# Patient Record
Sex: FEMALE | Race: WHITE | NOT HISPANIC OR LATINO | URBAN - METROPOLITAN AREA
[De-identification: names, ages, dates, MRNs, and addresses within clinical notes are randomized per-mention and may not be internally consistent; named-entity substitution may affect disease eponyms.]

---

## 2023-12-13 ENCOUNTER — NURSING HOME VISIT (OUTPATIENT)
Dept: POST ACUTE CARE | Facility: EXTERNAL LOCATION | Age: 88
End: 2023-12-13
Payer: MEDICARE

## 2023-12-13 DIAGNOSIS — F03.B0 MODERATE DEMENTIA WITHOUT BEHAVIORAL DISTURBANCE, PSYCHOTIC DISTURBANCE, MOOD DISTURBANCE, OR ANXIETY, UNSPECIFIED DEMENTIA TYPE (MULTI): Primary | ICD-10-CM

## 2023-12-13 DIAGNOSIS — I10 HYPERTENSION, ESSENTIAL: ICD-10-CM

## 2023-12-13 DIAGNOSIS — K21.9 GASTROESOPHAGEAL REFLUX DISEASE WITHOUT ESOPHAGITIS: ICD-10-CM

## 2023-12-13 PROCEDURE — 99349 HOME/RES VST EST MOD MDM 40: CPT

## 2023-12-13 NOTE — LETTER
Patient: Hodan Henry  : 1929    Encounter Date: 2023    PROGRESS NOTE    Subjective  Chief complaint: Hodan Henry is a 89 y.o. female who is an assisted living facility patient being seen and evaluated for  general medical care and monthly follow-up    HPI:  Patient seen and evaluated for general medical care and monthly follow-up.  Admitted to AL for assistance with ADLs and medical care.   PMH includes OP, HTN, dementia,, GERD. Patient at baseline mentation, oriented x 2, converses easily.  Offers no complaints at time.  Patient ambulates without assistive devices- gait steady.  BP within goal. Patient engaging with staff.  No concerns per nursing      Objective  Vital signs:  125/69-97.3-72-17    Physical Exam  Constitutional:       Appearance: Normal appearance.   HENT:      Head: Normocephalic.      Mouth/Throat:      Mouth: Mucous membranes are moist.   Eyes:      Extraocular Movements: Extraocular movements intact.   Cardiovascular:      Rate and Rhythm: Normal rate and regular rhythm.      Pulses: Normal pulses.      Heart sounds: Normal heart sounds.   Pulmonary:      Effort: Pulmonary effort is normal.      Breath sounds: Normal breath sounds.   Abdominal:      General: Bowel sounds are normal.      Palpations: Abdomen is soft.   Musculoskeletal:         General: Normal range of motion.      Cervical back: Normal range of motion and neck supple.   Skin:     General: Skin is warm and dry.      Capillary Refill: Capillary refill takes less than 2 seconds.   Neurological:      Mental Status: She is alert. Mental status is at baseline.   Psychiatric:         Mood and Affect: Mood normal.         Behavior: Behavior normal.         Assessment/Plan  Problem List Items Addressed This Visit       Hypertension, essential      Stable   BP at goal   lisinopril metoprolol   monitor         Dementia (CMS/[facility]) - Primary      Stable   baseline mentation- oriented x 2   converses easily   donepezil memantine    monitor         Gastroesophageal reflux disease without esophagitis      Stable   no epigastric pain or acidic taste in mouth   Encouraged to sit up after eating   Omeprazole   monitor          Medications, treatments, and labs reviewed  Continue medications and treatments as listed in EMR    SANIYA Burt      Electronically Signed By: SANIYA Burt   12/29/23  5:59 PM

## 2023-12-29 PROBLEM — M81.0 OP (OSTEOPOROSIS): Status: ACTIVE | Noted: 2023-12-29

## 2023-12-29 PROBLEM — I10 HYPERTENSION, ESSENTIAL: Status: ACTIVE | Noted: 2023-12-29

## 2023-12-29 PROBLEM — F03.90 DEMENTIA (MULTI): Status: ACTIVE | Noted: 2023-12-29

## 2023-12-29 PROBLEM — K21.9 GASTROESOPHAGEAL REFLUX DISEASE WITHOUT ESOPHAGITIS: Status: ACTIVE | Noted: 2023-12-29

## 2023-12-29 NOTE — ASSESSMENT & PLAN NOTE
Stable   no epigastric pain or acidic taste in mouth   Encouraged to sit up after eating   Omeprazole   monitor

## 2023-12-29 NOTE — PROGRESS NOTES
PROGRESS NOTE    Subjective   Chief complaint: Hodan Henry is a 89 y.o. female who is an assisted living facility patient being seen and evaluated for  general medical care and monthly follow-up    HPI:  Patient seen and evaluated for general medical care and monthly follow-up.  Admitted to AL for assistance with ADLs and medical care.   PMH includes OP, HTN, dementia,, GERD. Patient at baseline mentation, oriented x 2, converses easily.  Offers no complaints at time.  Patient ambulates without assistive devices- gait steady.  BP within goal. Patient engaging with staff.  No concerns per nursing      Objective   Vital signs:  125/69-97.3-72-17    Physical Exam  Constitutional:       Appearance: Normal appearance.   HENT:      Head: Normocephalic.      Mouth/Throat:      Mouth: Mucous membranes are moist.   Eyes:      Extraocular Movements: Extraocular movements intact.   Cardiovascular:      Rate and Rhythm: Normal rate and regular rhythm.      Pulses: Normal pulses.      Heart sounds: Normal heart sounds.   Pulmonary:      Effort: Pulmonary effort is normal.      Breath sounds: Normal breath sounds.   Abdominal:      General: Bowel sounds are normal.      Palpations: Abdomen is soft.   Musculoskeletal:         General: Normal range of motion.      Cervical back: Normal range of motion and neck supple.   Skin:     General: Skin is warm and dry.      Capillary Refill: Capillary refill takes less than 2 seconds.   Neurological:      Mental Status: She is alert. Mental status is at baseline.   Psychiatric:         Mood and Affect: Mood normal.         Behavior: Behavior normal.         Assessment/Plan   Problem List Items Addressed This Visit       Hypertension, essential      Stable   BP at goal   lisinopril metoprolol   monitor         Dementia (CMS/HCC) - Primary      Stable   baseline mentation- oriented x 2   converses easily   donepezil memantine   monitor         Gastroesophageal reflux disease without esophagitis       Stable   no epigastric pain or acidic taste in mouth   Encouraged to sit up after eating   Omeprazole   monitor          Medications, treatments, and labs reviewed  Continue medications and treatments as listed in EMR    Darling Harry, APRN-CNP

## 2024-01-05 ENCOUNTER — NURSING HOME VISIT (OUTPATIENT)
Dept: POST ACUTE CARE | Facility: EXTERNAL LOCATION | Age: 89
End: 2024-01-05
Payer: MEDICARE

## 2024-01-05 DIAGNOSIS — K21.9 GASTROESOPHAGEAL REFLUX DISEASE WITHOUT ESOPHAGITIS: ICD-10-CM

## 2024-01-05 DIAGNOSIS — I10 HYPERTENSION, ESSENTIAL: ICD-10-CM

## 2024-01-05 DIAGNOSIS — F03.B0 MODERATE DEMENTIA WITHOUT BEHAVIORAL DISTURBANCE, PSYCHOTIC DISTURBANCE, MOOD DISTURBANCE, OR ANXIETY, UNSPECIFIED DEMENTIA TYPE (MULTI): Primary | ICD-10-CM

## 2024-01-05 PROCEDURE — 99349 HOME/RES VST EST MOD MDM 40: CPT

## 2024-01-05 NOTE — LETTER
Patient: Hodan Henry  : 1929    Encounter Date: 2024    PROGRESS NOTE    Subjective  Chief complaint: Hodan Henry is a 89 y.o. female who is an assisted living facility patient being seen and evaluated for  general medical care and monthly follow-up    HPI:  Patient seen and evaluated for general medical care and monthly follow-up.  Patient at baseline mentation, confused, calm, cooperative.  No outbursts or agitation noted per nursing. Patient requires assistance with ADLs. Offers no complaints at time.  BP at goal. No concerns per nursing      Objective  Vital signs:  104/64-97.1-87-18-96%    Physical Exam  Constitutional:       Appearance: Normal appearance.   HENT:      Head: Normocephalic.      Mouth/Throat:      Mouth: Mucous membranes are moist.   Eyes:      Extraocular Movements: Extraocular movements intact.   Cardiovascular:      Rate and Rhythm: Normal rate and regular rhythm.      Pulses: Normal pulses.      Heart sounds: Normal heart sounds.   Pulmonary:      Effort: Pulmonary effort is normal.      Breath sounds: Normal breath sounds.   Abdominal:      General: Bowel sounds are normal.      Palpations: Abdomen is soft.   Musculoskeletal:         General: Normal range of motion.      Cervical back: Normal range of motion and neck supple.      Comments:   Generalized weakness   Skin:     General: Skin is warm and dry.      Capillary Refill: Capillary refill takes less than 2 seconds.   Neurological:      Mental Status: She is alert. Mental status is at baseline. She is disoriented.   Psychiatric:         Behavior: Behavior normal.         Assessment/Plan  Problem List Items Addressed This Visit       Hypertension, essential      Stable   BP at goal   lisinopril metoprolol   monitor         Dementia (CMS/[facility]) - Primary      Stable   baseline mentation   converses easily   donepezil memantine   monitor         Gastroesophageal reflux disease without esophagitis      Stable   no epigastric pain or  acidic taste in mouth   Encouraged to sit up after eating   Omeprazole   monitor          Medications, treatments, and labs reviewed  Continue medications and treatments as listed in EMR    SANIYA Burt      Electronically Signed By: SANIYA Burt   1/13/24 12:39 PM

## 2024-01-13 PROBLEM — R63.0 POOR APPETITE: Status: ACTIVE | Noted: 2024-01-13

## 2024-01-13 NOTE — PROGRESS NOTES
PROGRESS NOTE    Subjective   Chief complaint: Hodan Henry is a 89 y.o. female who is an assisted living facility patient being seen and evaluated for  general medical care and monthly follow-up    HPI:  Patient seen and evaluated for general medical care and monthly follow-up.  Patient at baseline mentation, confused, calm, cooperative.  No outbursts or agitation noted per nursing. Patient requires assistance with ADLs. Offers no complaints at time.  BP at goal. No concerns per nursing      Objective   Vital signs:  104/64-97.1-87-18-96%    Physical Exam  Constitutional:       Appearance: Normal appearance.   HENT:      Head: Normocephalic.      Mouth/Throat:      Mouth: Mucous membranes are moist.   Eyes:      Extraocular Movements: Extraocular movements intact.   Cardiovascular:      Rate and Rhythm: Normal rate and regular rhythm.      Pulses: Normal pulses.      Heart sounds: Normal heart sounds.   Pulmonary:      Effort: Pulmonary effort is normal.      Breath sounds: Normal breath sounds.   Abdominal:      General: Bowel sounds are normal.      Palpations: Abdomen is soft.   Musculoskeletal:         General: Normal range of motion.      Cervical back: Normal range of motion and neck supple.      Comments:   Generalized weakness   Skin:     General: Skin is warm and dry.      Capillary Refill: Capillary refill takes less than 2 seconds.   Neurological:      Mental Status: She is alert. Mental status is at baseline. She is disoriented.   Psychiatric:         Behavior: Behavior normal.         Assessment/Plan   Problem List Items Addressed This Visit       Hypertension, essential      Stable   BP at goal   lisinopril metoprolol   monitor         Dementia (CMS/[facility]) - Primary      Stable   baseline mentation   converses easily   donepezil memantine   monitor         Gastroesophageal reflux disease without esophagitis      Stable   no epigastric pain or acidic taste in mouth   Encouraged to sit up after eating    Omeprazole   monitor          Medications, treatments, and labs reviewed  Continue medications and treatments as listed in EMR    Darling Harry, APRN-CNP

## 2024-02-13 ENCOUNTER — NURSING HOME VISIT (OUTPATIENT)
Dept: POST ACUTE CARE | Facility: EXTERNAL LOCATION | Age: 89
End: 2024-02-13
Payer: MEDICARE

## 2024-02-13 DIAGNOSIS — F03.B0 MODERATE DEMENTIA WITHOUT BEHAVIORAL DISTURBANCE, PSYCHOTIC DISTURBANCE, MOOD DISTURBANCE, OR ANXIETY, UNSPECIFIED DEMENTIA TYPE (MULTI): Primary | ICD-10-CM

## 2024-02-13 DIAGNOSIS — R53.1 GENERALIZED WEAKNESS: ICD-10-CM

## 2024-02-13 DIAGNOSIS — I10 HYPERTENSION, ESSENTIAL: ICD-10-CM

## 2024-02-13 DIAGNOSIS — K21.9 GASTROESOPHAGEAL REFLUX DISEASE WITHOUT ESOPHAGITIS: ICD-10-CM

## 2024-02-13 DIAGNOSIS — R63.0 POOR APPETITE: ICD-10-CM

## 2024-02-13 PROCEDURE — 99349 HOME/RES VST EST MOD MDM 40: CPT

## 2024-02-13 NOTE — LETTER
Patient: Hodan Henry  : 1929    Encounter Date: 2024    PROGRESS NOTE    Subjective  Chief complaint: Hodan Henry is a 89 y.o. female who is an assisted living facility patient being seen and evaluated for  general medical care and monthly follow-up    HPI:  Patient seen and evaluated for general medical care and monthly follow-up.  Patient at baseline mentation, cooperative, pleasant.  Offers no complaints.  No outbursts or agitation noted per nursing.  Continues to engage in activities and meals with other residents.  BP within goal.  No changes in appetite or sleeping pattern.  No concerns per nursing.      Objective  Vital signs:  127/80-97.1-71-19-99%    Physical Exam  Constitutional:       Appearance: Normal appearance.   HENT:      Head: Normocephalic.      Mouth/Throat:      Mouth: Mucous membranes are moist.   Eyes:      Extraocular Movements: Extraocular movements intact.   Cardiovascular:      Rate and Rhythm: Normal rate and regular rhythm.      Pulses: Normal pulses.      Heart sounds: Normal heart sounds.   Pulmonary:      Effort: Pulmonary effort is normal.      Breath sounds: Normal breath sounds.   Abdominal:      General: Bowel sounds are normal.      Palpations: Abdomen is soft.   Musculoskeletal:         General: Normal range of motion.      Cervical back: Normal range of motion and neck supple.      Comments:   Generalized weakness   Skin:     General: Skin is warm and dry.      Capillary Refill: Capillary refill takes less than 2 seconds.   Neurological:      Mental Status: She is alert. Mental status is at baseline.   Psychiatric:         Mood and Affect: Mood normal.         Behavior: Behavior normal.         Assessment/Plan  Problem List Items Addressed This Visit       Hypertension, essential      Stable   BP at goal   lisinopril metoprolol   monitor         Dementia (CMS/[facility]) - Primary      Stable   baseline mentation   converses easily   donepezil memantine   monitor          Gastroesophageal reflux disease without esophagitis      Stable   no epigastric pain or acidic taste in mouth   Encouraged to sit up after eating   Omeprazole   monitor         Poor appetite      Stable   Mirtazapine   boost supplements 3 times daily   monitor intake and weight         Generalized weakness      Reinforced calling for assistance when needed   walker assist          Medications, treatments, and labs reviewed  Continue medications and treatments as listed in EMR    SANIYA Burt      Electronically Signed By: SANIYA Burt   2/19/24  5:41 PM

## 2024-02-19 PROBLEM — R53.1 GENERALIZED WEAKNESS: Status: ACTIVE | Noted: 2024-02-19

## 2024-02-19 NOTE — PROGRESS NOTES
PROGRESS NOTE    Subjective   Chief complaint: Hodan Henry is a 89 y.o. female who is an assisted living facility patient being seen and evaluated for  general medical care and monthly follow-up    HPI:  Patient seen and evaluated for general medical care and monthly follow-up.  Patient at baseline mentation, cooperative, pleasant.  Offers no complaints.  No outbursts or agitation noted per nursing.  Continues to engage in activities and meals with other residents.  BP within goal.  No changes in appetite or sleeping pattern.  No concerns per nursing.      Objective   Vital signs:  127/80-97.1-71-19-99%    Physical Exam  Constitutional:       Appearance: Normal appearance.   HENT:      Head: Normocephalic.      Mouth/Throat:      Mouth: Mucous membranes are moist.   Eyes:      Extraocular Movements: Extraocular movements intact.   Cardiovascular:      Rate and Rhythm: Normal rate and regular rhythm.      Pulses: Normal pulses.      Heart sounds: Normal heart sounds.   Pulmonary:      Effort: Pulmonary effort is normal.      Breath sounds: Normal breath sounds.   Abdominal:      General: Bowel sounds are normal.      Palpations: Abdomen is soft.   Musculoskeletal:         General: Normal range of motion.      Cervical back: Normal range of motion and neck supple.      Comments:   Generalized weakness   Skin:     General: Skin is warm and dry.      Capillary Refill: Capillary refill takes less than 2 seconds.   Neurological:      Mental Status: She is alert. Mental status is at baseline.   Psychiatric:         Mood and Affect: Mood normal.         Behavior: Behavior normal.         Assessment/Plan   Problem List Items Addressed This Visit       Hypertension, essential      Stable   BP at goal   lisinopril metoprolol   monitor         Dementia (CMS/[facility]) - Primary      Stable   baseline mentation   converses easily   donepezil memantine   monitor         Gastroesophageal reflux disease without esophagitis      Stable   no  epigastric pain or acidic taste in mouth   Encouraged to sit up after eating   Omeprazole   monitor         Poor appetite      Stable   Mirtazapine   boost supplements 3 times daily   monitor intake and weight         Generalized weakness      Reinforced calling for assistance when needed   walker assist          Medications, treatments, and labs reviewed  Continue medications and treatments as listed in EMR    Darling Harry, APRN-CNP

## 2024-03-19 ENCOUNTER — NURSING HOME VISIT (OUTPATIENT)
Dept: POST ACUTE CARE | Facility: EXTERNAL LOCATION | Age: 89
End: 2024-03-19
Payer: MEDICARE

## 2024-03-19 DIAGNOSIS — K21.9 GASTROESOPHAGEAL REFLUX DISEASE WITHOUT ESOPHAGITIS: ICD-10-CM

## 2024-03-19 DIAGNOSIS — I10 HYPERTENSION, ESSENTIAL: ICD-10-CM

## 2024-03-19 DIAGNOSIS — F03.B0 MODERATE DEMENTIA WITHOUT BEHAVIORAL DISTURBANCE, PSYCHOTIC DISTURBANCE, MOOD DISTURBANCE, OR ANXIETY, UNSPECIFIED DEMENTIA TYPE (MULTI): Primary | ICD-10-CM

## 2024-03-19 PROCEDURE — 99349 HOME/RES VST EST MOD MDM 40: CPT

## 2024-03-19 NOTE — LETTER
Patient: Hodan Henry  : 1929    Encounter Date: 2024    PROGRESS NOTE    Subjective  Chief complaint: Hodan Henry is a 89 y.o. female who is an assisted living facility patient being seen and evaluated for general medical care and monthly follow-up    HPI:  Patient seen and evaluated for general medical care monthly follow-up.  Patient at baseline mentation, pleasant, cooperative.  Offers no complaints at time.  BP within goal.  Engages in activities and meals with other residents.  No outbursts or agitation noted per nursing.  No concerns per nursing at this time      Objective  Vital signs:  128/69-97.6-65-17-98%    Physical Exam  Constitutional:       Appearance: Normal appearance.   HENT:      Head: Normocephalic.      Mouth/Throat:      Mouth: Mucous membranes are moist.   Eyes:      Extraocular Movements: Extraocular movements intact.   Cardiovascular:      Rate and Rhythm: Normal rate and regular rhythm.      Pulses: Normal pulses.      Heart sounds: Normal heart sounds.   Pulmonary:      Effort: Pulmonary effort is normal.      Breath sounds: Normal breath sounds.   Abdominal:      General: Bowel sounds are normal.      Palpations: Abdomen is soft.   Musculoskeletal:         General: Normal range of motion.      Cervical back: Normal range of motion and neck supple.      Comments:  generalized weakness   Skin:     General: Skin is warm and dry.      Capillary Refill: Capillary refill takes less than 2 seconds.   Neurological:      Mental Status: She is alert. Mental status is at baseline.   Psychiatric:         Mood and Affect: Mood normal.         Behavior: Behavior normal.         Assessment/Plan  Problem List Items Addressed This Visit       Hypertension, essential      Stable   BP at goal   lisinopril metoprolol   monitor         Dementia (CMS/[facility]) - Primary      Stable   baseline mentation   converses easily   donepezil memantine   monitor         Gastroesophageal reflux disease without  esophagitis      Stable   no epigastric pain or acidic taste in mouth   Encouraged to sit up after eating   Omeprazole   monitor          Medications, treatments, and labs reviewed  Continue medications and treatments as listed in EMR    SANIYA Burt      Electronically Signed By: SANIYA Burt   3/19/24  7:56 PM

## 2024-03-19 NOTE — PROGRESS NOTES
PROGRESS NOTE    Subjective   Chief complaint: Hodan Henry is a 89 y.o. female who is an assisted living facility patient being seen and evaluated for general medical care and monthly follow-up    HPI:  Patient seen and evaluated for general medical care monthly follow-up.  Patient at baseline mentation, pleasant, cooperative.  Offers no complaints at time.  BP within goal.  Engages in activities and meals with other residents.  No outbursts or agitation noted per nursing.  No concerns per nursing at this time      Objective   Vital signs:  128/69-97.6-65-17-98%    Physical Exam  Constitutional:       Appearance: Normal appearance.   HENT:      Head: Normocephalic.      Mouth/Throat:      Mouth: Mucous membranes are moist.   Eyes:      Extraocular Movements: Extraocular movements intact.   Cardiovascular:      Rate and Rhythm: Normal rate and regular rhythm.      Pulses: Normal pulses.      Heart sounds: Normal heart sounds.   Pulmonary:      Effort: Pulmonary effort is normal.      Breath sounds: Normal breath sounds.   Abdominal:      General: Bowel sounds are normal.      Palpations: Abdomen is soft.   Musculoskeletal:         General: Normal range of motion.      Cervical back: Normal range of motion and neck supple.      Comments:  generalized weakness   Skin:     General: Skin is warm and dry.      Capillary Refill: Capillary refill takes less than 2 seconds.   Neurological:      Mental Status: She is alert. Mental status is at baseline.   Psychiatric:         Mood and Affect: Mood normal.         Behavior: Behavior normal.         Assessment/Plan   Problem List Items Addressed This Visit       Hypertension, essential      Stable   BP at goal   lisinopril metoprolol   monitor         Dementia (CMS/[facility]) - Primary      Stable   baseline mentation   converses easily   donepezil memantine   monitor         Gastroesophageal reflux disease without esophagitis      Stable   no epigastric pain or acidic taste in mouth    Encouraged to sit up after eating   Omeprazole   monitor          Medications, treatments, and labs reviewed  Continue medications and treatments as listed in EMR    Darling Harry, APRN-CNP

## 2024-04-02 ENCOUNTER — NURSING HOME VISIT (OUTPATIENT)
Dept: POST ACUTE CARE | Facility: EXTERNAL LOCATION | Age: 89
End: 2024-04-02
Payer: MEDICARE

## 2024-04-02 DIAGNOSIS — R63.0 POOR APPETITE: ICD-10-CM

## 2024-04-02 DIAGNOSIS — F03.B0 MODERATE DEMENTIA WITHOUT BEHAVIORAL DISTURBANCE, PSYCHOTIC DISTURBANCE, MOOD DISTURBANCE, OR ANXIETY, UNSPECIFIED DEMENTIA TYPE (MULTI): Primary | ICD-10-CM

## 2024-04-02 DIAGNOSIS — I10 HYPERTENSION, ESSENTIAL: ICD-10-CM

## 2024-04-02 DIAGNOSIS — K21.9 GASTROESOPHAGEAL REFLUX DISEASE WITHOUT ESOPHAGITIS: ICD-10-CM

## 2024-04-02 PROCEDURE — 99348 HOME/RES VST EST LOW MDM 30: CPT

## 2024-04-02 NOTE — LETTER
Patient: Hodan Henry  : 1929    Encounter Date: 2024    PROGRESS NOTE    Subjective  Chief complaint: Hodan Henry is a 89 y.o. female who is an assisted living facility patient being seen and evaluated for general medical care and monthly follow-up    HPI:  Patient seen and evaluated for general medical care monthly follow-up.  Patient at baseline mentation, cooperative, pleasant.  Offers no complaints at time.  Engages in activities and meals with other residents.  Appetite stable.  Sleeping well.  BP at goal.  No concerns per nursing      Objective  Vital signs:  115/66-97.6-74-16-99%    Physical Exam  Constitutional:       Appearance: Normal appearance.   HENT:      Head: Normocephalic.      Mouth/Throat:      Mouth: Mucous membranes are moist.   Eyes:      Extraocular Movements: Extraocular movements intact.   Cardiovascular:      Rate and Rhythm: Normal rate and regular rhythm.      Pulses: Normal pulses.      Heart sounds: Normal heart sounds.   Pulmonary:      Effort: Pulmonary effort is normal.      Breath sounds: Normal breath sounds.   Abdominal:      General: Bowel sounds are normal.      Palpations: Abdomen is soft.   Musculoskeletal:         General: Normal range of motion.      Cervical back: Normal range of motion and neck supple.      Comments:  generalized weakness   Skin:     General: Skin is warm and dry.      Capillary Refill: Capillary refill takes less than 2 seconds.   Neurological:      Mental Status: She is alert. Mental status is at baseline.   Psychiatric:         Mood and Affect: Mood normal.         Behavior: Behavior normal.         Assessment/Plan  Problem List Items Addressed This Visit       Hypertension, essential      Stable   BP at goal   lisinopril metoprolol   monitor         Dementia (Multi) - Primary      Stable   baseline mentation   converses easily   donepezil memantine   monitor         Gastroesophageal reflux disease without esophagitis      Stable   no  epigastric pain or acidic taste in mouth   Encouraged to sit up after eating   Omeprazole   monitor         Poor appetite      Stable   Mirtazapine   boost supplements 3 times daily   monitor intake and weight          Medications, treatments, and labs reviewed  Continue medications and treatments as listed in EMR    SANIYA Burt      Electronically Signed By: SANIYA Burt   4/23/24  6:43 PM

## 2024-04-23 NOTE — PROGRESS NOTES
PROGRESS NOTE    Subjective   Chief complaint: Hodan Henry is a 89 y.o. female who is an assisted living facility patient being seen and evaluated for general medical care and monthly follow-up    HPI:  Patient seen and evaluated for general medical care monthly follow-up.  Patient at baseline mentation, cooperative, pleasant.  Offers no complaints at time.  Engages in activities and meals with other residents.  Appetite stable.  Sleeping well.  BP at goal.  No concerns per nursing      Objective   Vital signs:  115/66-97.6-74-16-99%    Physical Exam  Constitutional:       Appearance: Normal appearance.   HENT:      Head: Normocephalic.      Mouth/Throat:      Mouth: Mucous membranes are moist.   Eyes:      Extraocular Movements: Extraocular movements intact.   Cardiovascular:      Rate and Rhythm: Normal rate and regular rhythm.      Pulses: Normal pulses.      Heart sounds: Normal heart sounds.   Pulmonary:      Effort: Pulmonary effort is normal.      Breath sounds: Normal breath sounds.   Abdominal:      General: Bowel sounds are normal.      Palpations: Abdomen is soft.   Musculoskeletal:         General: Normal range of motion.      Cervical back: Normal range of motion and neck supple.      Comments:  generalized weakness   Skin:     General: Skin is warm and dry.      Capillary Refill: Capillary refill takes less than 2 seconds.   Neurological:      Mental Status: She is alert. Mental status is at baseline.   Psychiatric:         Mood and Affect: Mood normal.         Behavior: Behavior normal.         Assessment/Plan   Problem List Items Addressed This Visit       Hypertension, essential      Stable   BP at goal   lisinopril metoprolol   monitor         Dementia (Multi) - Primary      Stable   baseline mentation   converses easily   donepezil memantine   monitor         Gastroesophageal reflux disease without esophagitis      Stable   no epigastric pain or acidic taste in mouth   Encouraged to sit up after  eating   Omeprazole   monitor         Poor appetite      Stable   Mirtazapine   boost supplements 3 times daily   monitor intake and weight          Medications, treatments, and labs reviewed  Continue medications and treatments as listed in EMR    Darling Harry, APRN-CNP

## 2024-05-07 ENCOUNTER — NURSING HOME VISIT (OUTPATIENT)
Dept: POST ACUTE CARE | Facility: EXTERNAL LOCATION | Age: 89
End: 2024-05-07
Payer: MEDICARE

## 2024-05-07 DIAGNOSIS — R63.0 POOR APPETITE: ICD-10-CM

## 2024-05-07 DIAGNOSIS — R53.1 GENERALIZED WEAKNESS: ICD-10-CM

## 2024-05-07 DIAGNOSIS — F03.B0 MODERATE DEMENTIA WITHOUT BEHAVIORAL DISTURBANCE, PSYCHOTIC DISTURBANCE, MOOD DISTURBANCE, OR ANXIETY, UNSPECIFIED DEMENTIA TYPE (MULTI): Primary | ICD-10-CM

## 2024-05-07 DIAGNOSIS — K21.9 GASTROESOPHAGEAL REFLUX DISEASE WITHOUT ESOPHAGITIS: ICD-10-CM

## 2024-05-07 DIAGNOSIS — I10 HYPERTENSION, ESSENTIAL: ICD-10-CM

## 2024-05-07 PROCEDURE — 99349 HOME/RES VST EST MOD MDM 40: CPT

## 2024-05-07 NOTE — LETTER
Patient: Hodan Henry  : 1929    Encounter Date: 2024    PROGRESS NOTE    Subjective  Chief complaint: Hodan Henry is a 89 y.o. female who is an assisted living facility patient being seen and evaluated for general medical care and monthly follow-up    HPI:  Patient seen and evaluated for general medical care monthly follow-up.  Patient at baseline mentation, pleasant, cooperative.  Offers no complaints at time.  BP within goal.  Engages in activities and meals with other residents.  No outbursts or agitation noted per nursing.  No concerns per nursing at this time      Objective  Vital signs:  119/76-96.1-91-15-96%    Physical Exam  Constitutional:       Appearance: Normal appearance.   HENT:      Head: Normocephalic.      Mouth/Throat:      Mouth: Mucous membranes are moist.   Eyes:      Extraocular Movements: Extraocular movements intact.   Cardiovascular:      Rate and Rhythm: Normal rate and regular rhythm.      Pulses: Normal pulses.      Heart sounds: Normal heart sounds.   Pulmonary:      Effort: Pulmonary effort is normal.      Breath sounds: Normal breath sounds.   Abdominal:      General: Bowel sounds are normal.      Palpations: Abdomen is soft.   Musculoskeletal:         General: Normal range of motion.      Cervical back: Normal range of motion and neck supple.      Comments:  weakness   Skin:     General: Skin is warm and dry.      Capillary Refill: Capillary refill takes less than 2 seconds.   Neurological:      Mental Status: She is alert. Mental status is at baseline.   Psychiatric:         Mood and Affect: Mood normal.         Behavior: Behavior normal.         Assessment/Plan  Problem List Items Addressed This Visit       Hypertension, essential      Stable   BP at goal   lisinopril metoprolol   monitor         Dementia (Multi) - Primary      Stable   baseline mentation   converses easily   donepezil memantine   monitor         Gastroesophageal reflux disease without esophagitis       Stable   no epigastric pain or acidic taste in mouth   Encouraged to sit up after eating   Omeprazole   monitor         Poor appetite      Stable   Mirtazapine   boost supplements 3 times daily   monitor intake and weight         Generalized weakness      Reinforced calling for assistance when needed   walker assist          Medications, treatments, and labs reviewed  Continue medications and treatments as listed in EMR    SANIYA Burt      Electronically Signed By: SANIYA Burt   5/28/24  8:00 PM

## 2024-05-28 NOTE — PROGRESS NOTES
PROGRESS NOTE    Subjective   Chief complaint: Hodan Henry is a 89 y.o. female who is an assisted living facility patient being seen and evaluated for general medical care and monthly follow-up    HPI:  Patient seen and evaluated for general medical care monthly follow-up.  Patient at baseline mentation, pleasant, cooperative.  Offers no complaints at time.  BP within goal.  Engages in activities and meals with other residents.  No outbursts or agitation noted per nursing.  No concerns per nursing at this time      Objective   Vital signs:  119/76-96.1-91-15-96%    Physical Exam  Constitutional:       Appearance: Normal appearance.   HENT:      Head: Normocephalic.      Mouth/Throat:      Mouth: Mucous membranes are moist.   Eyes:      Extraocular Movements: Extraocular movements intact.   Cardiovascular:      Rate and Rhythm: Normal rate and regular rhythm.      Pulses: Normal pulses.      Heart sounds: Normal heart sounds.   Pulmonary:      Effort: Pulmonary effort is normal.      Breath sounds: Normal breath sounds.   Abdominal:      General: Bowel sounds are normal.      Palpations: Abdomen is soft.   Musculoskeletal:         General: Normal range of motion.      Cervical back: Normal range of motion and neck supple.      Comments:  weakness   Skin:     General: Skin is warm and dry.      Capillary Refill: Capillary refill takes less than 2 seconds.   Neurological:      Mental Status: She is alert. Mental status is at baseline.   Psychiatric:         Mood and Affect: Mood normal.         Behavior: Behavior normal.         Assessment/Plan   Problem List Items Addressed This Visit       Hypertension, essential      Stable   BP at goal   lisinopril metoprolol   monitor         Dementia (Multi) - Primary      Stable   baseline mentation   converses easily   donepezil memantine   monitor         Gastroesophageal reflux disease without esophagitis      Stable   no epigastric pain or acidic taste in mouth   Encouraged to  sit up after eating   Omeprazole   monitor         Poor appetite      Stable   Mirtazapine   boost supplements 3 times daily   monitor intake and weight         Generalized weakness      Reinforced calling for assistance when needed   walker assist          Medications, treatments, and labs reviewed  Continue medications and treatments as listed in EMR    Darling Harry, APRN-CNP

## 2024-06-04 ENCOUNTER — NURSING HOME VISIT (OUTPATIENT)
Dept: POST ACUTE CARE | Facility: EXTERNAL LOCATION | Age: 89
End: 2024-06-04
Payer: MEDICARE

## 2024-06-04 DIAGNOSIS — K21.9 GASTROESOPHAGEAL REFLUX DISEASE WITHOUT ESOPHAGITIS: ICD-10-CM

## 2024-06-04 DIAGNOSIS — M81.0 OSTEOPOROSIS WITHOUT CURRENT PATHOLOGICAL FRACTURE, UNSPECIFIED OSTEOPOROSIS TYPE: ICD-10-CM

## 2024-06-04 DIAGNOSIS — F03.B0 MODERATE DEMENTIA WITHOUT BEHAVIORAL DISTURBANCE, PSYCHOTIC DISTURBANCE, MOOD DISTURBANCE, OR ANXIETY, UNSPECIFIED DEMENTIA TYPE (MULTI): Primary | ICD-10-CM

## 2024-06-04 DIAGNOSIS — R63.0 POOR APPETITE: ICD-10-CM

## 2024-06-04 DIAGNOSIS — I10 HYPERTENSION, ESSENTIAL: ICD-10-CM

## 2024-06-04 DIAGNOSIS — R53.1 GENERALIZED WEAKNESS: ICD-10-CM

## 2024-06-04 PROCEDURE — 99348 HOME/RES VST EST LOW MDM 30: CPT

## 2024-06-04 NOTE — LETTER
Patient: Hodan Henry  : 1929    Encounter Date: 2024    PROGRESS NOTE    Subjective  Chief complaint: Hodan Henry is a 89 y.o. female who is an assisted living facility patient being seen and evaluated for  general medical care monthly follow-up    HPI:  Patient seen and evaluated for general medical care and monthly follow-up.  Patient at baseline mentation, pleasant, cooperative.  Offers no complaints at time.  BP within goal.  Engages in activities and meals with other residents.  No outbursts or agitation noted per nursing.  No concerns per nursing at this time      Objective  Vital signs:  129/72-98.9-92-15-97%    Physical Exam  Constitutional:       Appearance: Normal appearance.   HENT:      Head: Normocephalic.      Mouth/Throat:      Mouth: Mucous membranes are moist.   Eyes:      Extraocular Movements: Extraocular movements intact.   Cardiovascular:      Rate and Rhythm: Normal rate and regular rhythm.      Pulses: Normal pulses.      Heart sounds: Normal heart sounds.   Pulmonary:      Effort: Pulmonary effort is normal.      Breath sounds: Normal breath sounds.   Abdominal:      General: Bowel sounds are normal.      Palpations: Abdomen is soft.   Musculoskeletal:         General: Normal range of motion.      Cervical back: Normal range of motion and neck supple.      Comments: weakness   Skin:     General: Skin is warm and dry.      Capillary Refill: Capillary refill takes less than 2 seconds.   Neurological:      Mental Status: She is alert. Mental status is at baseline.   Psychiatric:         Mood and Affect: Mood normal.         Behavior: Behavior normal.         Assessment/Plan  Problem List Items Addressed This Visit       Hypertension, essential      Stable   BP at goal   lisinopril metoprolol   monitor         OP (osteoporosis)      Continue alendronate   monitor for falls/injuries         Dementia (Multi) - Primary      Stable   baseline mentation   converses easily   donepezil  memantine   monitor         Gastroesophageal reflux disease without esophagitis      Stable   no epigastric pain or acidic taste in mouth   Encouraged to sit up after eating   Omeprazole   monitor         Poor appetite      Stable weight   Mirtazapine   boost supplements 3 times daily   monitor intake and weight         Generalized weakness      Reinforced calling for assistance when needed   walker assist          Medications, treatments, and labs reviewed  Continue medications and treatments as listed in EMR    SANIYA Burt      Electronically Signed By: SANIYA Burt   6/5/24  4:09 PM

## 2024-06-05 NOTE — PROGRESS NOTES
PROGRESS NOTE    Subjective   Chief complaint: Hodan Henry is a 89 y.o. female who is an assisted living facility patient being seen and evaluated for  general medical care monthly follow-up    HPI:  Patient seen and evaluated for general medical care and monthly follow-up.  Patient at baseline mentation, pleasant, cooperative.  Offers no complaints at time.  BP within goal.  Engages in activities and meals with other residents.  No outbursts or agitation noted per nursing.  No concerns per nursing at this time      Objective   Vital signs:  129/72-98.9-92-15-97%    Physical Exam  Constitutional:       Appearance: Normal appearance.   HENT:      Head: Normocephalic.      Mouth/Throat:      Mouth: Mucous membranes are moist.   Eyes:      Extraocular Movements: Extraocular movements intact.   Cardiovascular:      Rate and Rhythm: Normal rate and regular rhythm.      Pulses: Normal pulses.      Heart sounds: Normal heart sounds.   Pulmonary:      Effort: Pulmonary effort is normal.      Breath sounds: Normal breath sounds.   Abdominal:      General: Bowel sounds are normal.      Palpations: Abdomen is soft.   Musculoskeletal:         General: Normal range of motion.      Cervical back: Normal range of motion and neck supple.      Comments: weakness   Skin:     General: Skin is warm and dry.      Capillary Refill: Capillary refill takes less than 2 seconds.   Neurological:      Mental Status: She is alert. Mental status is at baseline.   Psychiatric:         Mood and Affect: Mood normal.         Behavior: Behavior normal.         Assessment/Plan   Problem List Items Addressed This Visit       Hypertension, essential      Stable   BP at goal   lisinopril metoprolol   monitor         OP (osteoporosis)      Continue alendronate   monitor for falls/injuries         Dementia (Multi) - Primary      Stable   baseline mentation   converses easily   donepezil memantine   monitor         Gastroesophageal reflux disease without  esophagitis      Stable   no epigastric pain or acidic taste in mouth   Encouraged to sit up after eating   Omeprazole   monitor         Poor appetite      Stable weight   Mirtazapine   boost supplements 3 times daily   monitor intake and weight         Generalized weakness      Reinforced calling for assistance when needed   walker assist          Medications, treatments, and labs reviewed  Continue medications and treatments as listed in EMR    Darling Harry, APRN-CNP

## 2024-06-14 ENCOUNTER — NURSING HOME VISIT (OUTPATIENT)
Dept: POST ACUTE CARE | Facility: EXTERNAL LOCATION | Age: 89
End: 2024-06-14
Payer: MEDICARE

## 2024-06-14 DIAGNOSIS — I10 HYPERTENSION, ESSENTIAL: ICD-10-CM

## 2024-06-14 DIAGNOSIS — F03.B0 MODERATE DEMENTIA WITHOUT BEHAVIORAL DISTURBANCE, PSYCHOTIC DISTURBANCE, MOOD DISTURBANCE, OR ANXIETY, UNSPECIFIED DEMENTIA TYPE (MULTI): ICD-10-CM

## 2024-06-14 DIAGNOSIS — R53.1 GENERALIZED WEAKNESS: ICD-10-CM

## 2024-06-14 DIAGNOSIS — S51.012D SKIN TEAR OF LEFT ELBOW WITHOUT COMPLICATION, SUBSEQUENT ENCOUNTER: Primary | ICD-10-CM

## 2024-06-14 DIAGNOSIS — M81.0 OSTEOPOROSIS WITHOUT CURRENT PATHOLOGICAL FRACTURE, UNSPECIFIED OSTEOPOROSIS TYPE: ICD-10-CM

## 2024-06-14 PROCEDURE — 99347 HOME/RES VST EST SF MDM 20: CPT

## 2024-06-14 NOTE — LETTER
Patient: Hodan Henry  : 1929    Encounter Date: 2024    PROGRESS NOTE    Subjective  Chief complaint: Hodan Henry is a 89 y.o. female who is an assisted living facility patient being seen and evaluated for  skin tear left elbow    HPI:  Patient seen and evaluated for skin tear to left elbow.  Wound care per instruction.  No surrounding redness, swelling.  No F/C/N/V/D, SOB, cough.  No concerns per nursing    Objective  Vital signs:  129/72-98.9-92-18-97%    Physical Exam  Constitutional:       Appearance: Normal appearance.   HENT:      Head: Normocephalic.      Mouth/Throat:      Mouth: Mucous membranes are moist.   Eyes:      Extraocular Movements: Extraocular movements intact.   Cardiovascular:      Rate and Rhythm: Normal rate and regular rhythm.      Pulses: Normal pulses.      Heart sounds: Normal heart sounds.   Pulmonary:      Effort: Pulmonary effort is normal.      Breath sounds: Normal breath sounds.   Abdominal:      General: Bowel sounds are normal.      Palpations: Abdomen is soft.   Musculoskeletal:         General: Normal range of motion.      Cervical back: Normal range of motion and neck supple.      Comments:  weakness   Skin:     General: Skin is warm and dry.      Capillary Refill: Capillary refill takes less than 2 seconds.      Comments: .  Skin tear left elbow   Neurological:      Mental Status: She is alert. Mental status is at baseline.   Psychiatric:         Mood and Affect: Mood normal.         Behavior: Behavior normal.       Assessment/Plan  Problem List Items Addressed This Visit       Hypertension, essential      Stable   BP at goal   lisinopril metoprolol   monitor         OP (osteoporosis)      Continue alendronate   monitor for falls/injuries         Dementia (Multi)      Stable   baseline mentation   converses easily   donepezil memantine   monitor         Generalized weakness      Reinforced calling for assistance when needed   walker assist         Skin tear of left  elbow without complication - Primary      cleanse with NS, pat dry, Xeroform, DSD          Medications, treatments, and labs reviewed  Continue medications and treatments as listed in EMR    SANIYA Burt      Electronically Signed By: SANIYA Burt   6/30/24 12:36 PM

## 2024-06-30 PROBLEM — S51.012A SKIN TEAR OF LEFT ELBOW WITHOUT COMPLICATION: Status: ACTIVE | Noted: 2024-06-30

## 2024-06-30 NOTE — PROGRESS NOTES
PROGRESS NOTE    Subjective   Chief complaint: Hodan Henry is a 89 y.o. female who is an assisted living facility patient being seen and evaluated for  skin tear left elbow    HPI:  Patient seen and evaluated for skin tear to left elbow.  Wound care per instruction.  No surrounding redness, swelling.  No F/C/N/V/D, SOB, cough.  No concerns per nursing    Objective   Vital signs:  129/72-98.9-92-18-97%    Physical Exam  Constitutional:       Appearance: Normal appearance.   HENT:      Head: Normocephalic.      Mouth/Throat:      Mouth: Mucous membranes are moist.   Eyes:      Extraocular Movements: Extraocular movements intact.   Cardiovascular:      Rate and Rhythm: Normal rate and regular rhythm.      Pulses: Normal pulses.      Heart sounds: Normal heart sounds.   Pulmonary:      Effort: Pulmonary effort is normal.      Breath sounds: Normal breath sounds.   Abdominal:      General: Bowel sounds are normal.      Palpations: Abdomen is soft.   Musculoskeletal:         General: Normal range of motion.      Cervical back: Normal range of motion and neck supple.      Comments:  weakness   Skin:     General: Skin is warm and dry.      Capillary Refill: Capillary refill takes less than 2 seconds.      Comments: .  Skin tear left elbow   Neurological:      Mental Status: She is alert. Mental status is at baseline.   Psychiatric:         Mood and Affect: Mood normal.         Behavior: Behavior normal.       Assessment/Plan   Problem List Items Addressed This Visit       Hypertension, essential      Stable   BP at goal   lisinopril metoprolol   monitor         OP (osteoporosis)      Continue alendronate   monitor for falls/injuries         Dementia (Multi)      Stable   baseline mentation   converses easily   donepezil memantine   monitor         Generalized weakness      Reinforced calling for assistance when needed   walker assist         Skin tear of left elbow without complication - Primary      cleanse with NS, pat dry,  Xeroform, DSD          Medications, treatments, and labs reviewed  Continue medications and treatments as listed in EMR    Darling Harry, APRN-CNP

## 2024-07-12 ENCOUNTER — NURSING HOME VISIT (OUTPATIENT)
Dept: POST ACUTE CARE | Facility: EXTERNAL LOCATION | Age: 89
End: 2024-07-12
Payer: MEDICARE

## 2024-07-12 DIAGNOSIS — F03.B0 MODERATE DEMENTIA WITHOUT BEHAVIORAL DISTURBANCE, PSYCHOTIC DISTURBANCE, MOOD DISTURBANCE, OR ANXIETY, UNSPECIFIED DEMENTIA TYPE (MULTI): Primary | ICD-10-CM

## 2024-07-12 DIAGNOSIS — K21.9 GASTROESOPHAGEAL REFLUX DISEASE WITHOUT ESOPHAGITIS: ICD-10-CM

## 2024-07-12 DIAGNOSIS — R53.1 GENERALIZED WEAKNESS: ICD-10-CM

## 2024-07-12 DIAGNOSIS — I10 HYPERTENSION, ESSENTIAL: ICD-10-CM

## 2024-07-12 NOTE — LETTER
Patient: Hodan Henry  : 1929    Encounter Date: 2024    PROGRESS NOTE    Subjective  Chief complaint: Hodan Henry is a 89 y.o. female who is an assisted living facility patient being seen and evaluated for general medical care and monthly follow-up    HPI:  Patient seen and evaluated for general medical care and monthly follow-up.  Patient at baseline mentation, pleasant, cooperative.  Offers no complaints at time.  BP within goal.  Engages in activities and meals with other residents.  No outbursts or agitation noted per nursing.  No concerns per nursing at this time      Objective  Vital signs:  120/60-82-18    Physical Exam  Constitutional:       Appearance: Normal appearance.   HENT:      Head: Normocephalic.      Mouth/Throat:      Mouth: Mucous membranes are moist.   Eyes:      Extraocular Movements: Extraocular movements intact.   Cardiovascular:      Rate and Rhythm: Normal rate and regular rhythm.      Pulses: Normal pulses.      Heart sounds: Normal heart sounds.   Pulmonary:      Effort: Pulmonary effort is normal.      Breath sounds: Normal breath sounds.   Abdominal:      General: Bowel sounds are normal.      Palpations: Abdomen is soft.   Musculoskeletal:         General: Normal range of motion.      Cervical back: Normal range of motion and neck supple.      Comments:  weakness   Skin:     General: Skin is warm and dry.      Capillary Refill: Capillary refill takes less than 2 seconds.   Neurological:      Mental Status: She is alert. Mental status is at baseline.   Psychiatric:         Mood and Affect: Mood normal.         Behavior: Behavior normal.         Assessment/Plan  Problem List Items Addressed This Visit       Hypertension, essential      Stable   BP at goal   lisinopril metoprolol   monitor         Dementia (Multi) - Primary      Stable   baseline mentation   converses easily   donepezil memantine   monitor           Gastroesophageal reflux disease without esophagitis       Stable   no epigastric pain or acidic taste in mouth   Encouraged to sit up after eating   Omeprazole   monitor         Generalized weakness      Reinforced calling for assistance when needed   walker assist          Medications, treatments, and labs reviewed  Continue medications and treatments as listed in EMR    SANIYA Burt      Electronically Signed By: SANIYA Burt   7/13/24 11:39 PM

## 2024-07-14 NOTE — PROGRESS NOTES
PROGRESS NOTE    Subjective   Chief complaint: Hodan Henry is a 89 y.o. female who is an assisted living facility patient being seen and evaluated for general medical care and monthly follow-up    HPI:  Patient seen and evaluated for general medical care and monthly follow-up.  Patient at baseline mentation, pleasant, cooperative.  Offers no complaints at time.  BP within goal.  Engages in activities and meals with other residents.  No outbursts or agitation noted per nursing.  No concerns per nursing at this time      Objective   Vital signs:  120/60-82-18    Physical Exam  Constitutional:       Appearance: Normal appearance.   HENT:      Head: Normocephalic.      Mouth/Throat:      Mouth: Mucous membranes are moist.   Eyes:      Extraocular Movements: Extraocular movements intact.   Cardiovascular:      Rate and Rhythm: Normal rate and regular rhythm.      Pulses: Normal pulses.      Heart sounds: Normal heart sounds.   Pulmonary:      Effort: Pulmonary effort is normal.      Breath sounds: Normal breath sounds.   Abdominal:      General: Bowel sounds are normal.      Palpations: Abdomen is soft.   Musculoskeletal:         General: Normal range of motion.      Cervical back: Normal range of motion and neck supple.      Comments:  weakness   Skin:     General: Skin is warm and dry.      Capillary Refill: Capillary refill takes less than 2 seconds.   Neurological:      Mental Status: She is alert. Mental status is at baseline.   Psychiatric:         Mood and Affect: Mood normal.         Behavior: Behavior normal.         Assessment/Plan   Problem List Items Addressed This Visit       Hypertension, essential      Stable   BP at goal   lisinopril metoprolol   monitor         Dementia (Multi) - Primary      Stable   baseline mentation   converses easily   donepezil memantine   monitor           Gastroesophageal reflux disease without esophagitis      Stable   no epigastric pain or acidic taste in mouth   Encouraged to  sit up after eating   Omeprazole   monitor         Generalized weakness      Reinforced calling for assistance when needed   walker assist          Medications, treatments, and labs reviewed  Continue medications and treatments as listed in EMR    Darling Harry, APRN-CNP

## 2024-08-16 ENCOUNTER — NURSING HOME VISIT (OUTPATIENT)
Dept: POST ACUTE CARE | Facility: EXTERNAL LOCATION | Age: 89
End: 2024-08-16
Payer: MEDICARE

## 2024-08-16 DIAGNOSIS — F03.B0 MODERATE DEMENTIA WITHOUT BEHAVIORAL DISTURBANCE, PSYCHOTIC DISTURBANCE, MOOD DISTURBANCE, OR ANXIETY, UNSPECIFIED DEMENTIA TYPE (MULTI): ICD-10-CM

## 2024-08-16 DIAGNOSIS — K21.9 GASTROESOPHAGEAL REFLUX DISEASE WITHOUT ESOPHAGITIS: ICD-10-CM

## 2024-08-16 DIAGNOSIS — R05.1 ACUTE COUGH: Primary | ICD-10-CM

## 2024-08-16 DIAGNOSIS — I10 HYPERTENSION, ESSENTIAL: ICD-10-CM

## 2024-08-16 NOTE — LETTER
Patient: Hodan Henry  : 1929    Encounter Date: 2024    PROGRESS NOTE    Subjective  Chief complaint: Hodan Henry is a 89 y.o. female who is an assisted living facility patient being seen and evaluated for  general medical care and monthly follow-up    HPI:  Patient seen and evaluated for general medical care and monthly follow-up.  Patient at baseline mentation, pleasant, cooperative.  Complains of not wanting to be around other people right now due to nonproductive cough.  Offers no complaints of F/C/N/V/D, SOB, nasal drainage.  Unknown exposure to COVID.  BP within goal.  Appetite good.  Sleeping well.  No other concerns per nursing      Objective  Vital signs:  120/88, 97.1, 90, 16, 97%    Physical Exam  Constitutional:       Appearance: Normal appearance.   HENT:      Head: Normocephalic.      Mouth/Throat:      Mouth: Mucous membranes are moist.   Eyes:      Extraocular Movements: Extraocular movements intact.   Cardiovascular:      Rate and Rhythm: Normal rate and regular rhythm.      Pulses: Normal pulses.      Heart sounds: Normal heart sounds.   Pulmonary:      Effort: Pulmonary effort is normal.      Breath sounds: Normal breath sounds.   Abdominal:      General: Bowel sounds are normal.      Palpations: Abdomen is soft.   Musculoskeletal:         General: Normal range of motion.      Cervical back: Normal range of motion and neck supple.      Comments:  weakness   Skin:     General: Skin is warm and dry.      Capillary Refill: Capillary refill takes less than 2 seconds.   Neurological:      Mental Status: She is alert. Mental status is at baseline.   Psychiatric:         Mood and Affect: Mood normal.         Behavior: Behavior normal.         Assessment/Plan  Problem List Items Addressed This Visit       Hypertension, essential      Stable   BP at goal   lisinopril metoprolol   monitor         Dementia (Multi)      Stable   baseline mentation   converses easily   donepezil memantine    monitor           Gastroesophageal reflux disease without esophagitis      Stable   no epigastric pain or acidic taste in mouth   Encouraged to sit up after eating   Omeprazole   monitor         Acute cough - Primary      No associated constitutional symptoms   COVID-negative   Harini sadler   CXR          Medications, treatments, and labs reviewed  Continue medications and treatments as listed in EMR    SANIYA Burt      Electronically Signed By: SANIYA Burt   8/17/24  5:33 PM

## 2024-08-17 PROBLEM — R05.1 ACUTE COUGH: Status: ACTIVE | Noted: 2024-08-17

## 2024-08-17 NOTE — PROGRESS NOTES
PROGRESS NOTE    Subjective   Chief complaint: Hodan Henry is a 89 y.o. female who is an assisted living facility patient being seen and evaluated for  general medical care and monthly follow-up    HPI:  Patient seen and evaluated for general medical care and monthly follow-up.  Patient at baseline mentation, pleasant, cooperative.  Complains of not wanting to be around other people right now due to nonproductive cough.  Offers no complaints of F/C/N/V/D, SOB, nasal drainage.  Unknown exposure to COVID.  BP within goal.  Appetite good.  Sleeping well.  No other concerns per nursing      Objective   Vital signs:  120/88, 97.1, 90, 16, 97%    Physical Exam  Constitutional:       Appearance: Normal appearance.   HENT:      Head: Normocephalic.      Mouth/Throat:      Mouth: Mucous membranes are moist.   Eyes:      Extraocular Movements: Extraocular movements intact.   Cardiovascular:      Rate and Rhythm: Normal rate and regular rhythm.      Pulses: Normal pulses.      Heart sounds: Normal heart sounds.   Pulmonary:      Effort: Pulmonary effort is normal.      Breath sounds: Normal breath sounds.   Abdominal:      General: Bowel sounds are normal.      Palpations: Abdomen is soft.   Musculoskeletal:         General: Normal range of motion.      Cervical back: Normal range of motion and neck supple.      Comments:  weakness   Skin:     General: Skin is warm and dry.      Capillary Refill: Capillary refill takes less than 2 seconds.   Neurological:      Mental Status: She is alert. Mental status is at baseline.   Psychiatric:         Mood and Affect: Mood normal.         Behavior: Behavior normal.         Assessment/Plan   Problem List Items Addressed This Visit       Hypertension, essential      Stable   BP at goal   lisinopril metoprolol   monitor         Dementia (Multi)      Stable   baseline mentation   converses easily   donepezil memantine   monitor           Gastroesophageal reflux disease without esophagitis       Stable   no epigastric pain or acidic taste in mouth   Encouraged to sit up after eating   Omeprazole   monitor         Acute cough - Primary      No associated constitutional symptoms   COVID-negative   Harini tussin   CXR          Medications, treatments, and labs reviewed  Continue medications and treatments as listed in EMR    Darling Harry, APRN-CNP

## 2024-09-03 ENCOUNTER — NURSING HOME VISIT (OUTPATIENT)
Dept: POST ACUTE CARE | Facility: EXTERNAL LOCATION | Age: 89
End: 2024-09-03
Payer: MEDICARE

## 2024-09-03 DIAGNOSIS — F01.50 VASCULAR DEMENTIA WITHOUT BEHAVIORAL DISTURBANCE, PSYCHOTIC DISTURBANCE, MOOD DISTURBANCE, OR ANXIETY, UNSPECIFIED DEMENTIA SEVERITY (MULTI): ICD-10-CM

## 2024-09-03 DIAGNOSIS — I10 HYPERTENSION, ESSENTIAL: ICD-10-CM

## 2024-09-03 DIAGNOSIS — R53.81 MALAISE: ICD-10-CM

## 2024-09-03 DIAGNOSIS — R53.1 GENERALIZED WEAKNESS: ICD-10-CM

## 2024-09-03 DIAGNOSIS — R09.81 CONGESTION OF NASAL SINUS: Primary | ICD-10-CM

## 2024-09-03 PROCEDURE — 99348 HOME/RES VST EST LOW MDM 30: CPT

## 2024-09-03 NOTE — LETTER
Patient: Hodan Herny  : 1929    Encounter Date: 2024    PROGRESS NOTE    Subjective  Chief complaint: Hodan Henry is a 89 y.o. female who is an assisted living facility patient being seen and evaluated for general medical care and monthly follow up    HPI:  Patient seen and evaluated for general medical care and monthly follow-up.  Patient at baseline mentation, pleasant, cooperative. Complains of nasal congestion, sneezing and nonproductive cough. No F/C/N/V/D. BP within goal.  Engages in activities and meals with other residents.  No outbursts or agitation noted per nursing.  No concerns per nursing at this time      Objective  Vital signs: 140/63-97.4-86-12-96%    Physical Exam  Constitutional:       Appearance: Normal appearance.   HENT:      Head: Normocephalic.      Mouth/Throat:      Mouth: Mucous membranes are moist.   Eyes:      Extraocular Movements: Extraocular movements intact.   Cardiovascular:      Rate and Rhythm: Normal rate and regular rhythm.      Pulses: Normal pulses.      Heart sounds: Normal heart sounds.   Pulmonary:      Effort: Pulmonary effort is normal.      Breath sounds: Normal breath sounds.   Abdominal:      General: Bowel sounds are normal.      Palpations: Abdomen is soft.   Musculoskeletal:         General: Normal range of motion.      Cervical back: Normal range of motion and neck supple.      Comments:  weakness   Skin:     General: Skin is warm and dry.      Capillary Refill: Capillary refill takes less than 2 seconds.   Neurological:      Mental Status: She is alert. Mental status is at baseline.   Psychiatric:         Mood and Affect: Mood normal.         Behavior: Behavior normal.         Assessment/Plan  Problem List Items Addressed This Visit       Hypertension, essential      Stable   BP at goal   lisinopril metoprolol   monitor         Dementia (Multi)      Stable   baseline mentation   converses easily   donepezil memantine   monitor           Generalized  weakness      Reinforced calling for assistance when needed   walker assist         Congestion of nasal sinus - Primary     Nasal drainage and sneezing  Covid test          Medications, treatments, and labs reviewed  Continue medications and treatments as listed in EMR    SANIYA Burt      Electronically Signed By: SANIYA Burt   9/3/24  7:39 PM

## 2024-09-03 NOTE — PROGRESS NOTES
PROGRESS NOTE    Subjective   Chief complaint: Hodan Henry is a 89 y.o. female who is an assisted living facility patient being seen and evaluated for general medical care and monthly follow up    HPI:  Patient seen and evaluated for general medical care and monthly follow-up.  Patient at baseline mentation, pleasant, cooperative. Complains of nasal congestion, sneezing and nonproductive cough. No F/C/N/V/D. BP within goal.  Engages in activities and meals with other residents.  No outbursts or agitation noted per nursing.  No concerns per nursing at this time      Objective   Vital signs: 140/63-97.4-86-12-96%    Physical Exam  Constitutional:       Appearance: Normal appearance.   HENT:      Head: Normocephalic.      Mouth/Throat:      Mouth: Mucous membranes are moist.   Eyes:      Extraocular Movements: Extraocular movements intact.   Cardiovascular:      Rate and Rhythm: Normal rate and regular rhythm.      Pulses: Normal pulses.      Heart sounds: Normal heart sounds.   Pulmonary:      Effort: Pulmonary effort is normal.      Breath sounds: Normal breath sounds.   Abdominal:      General: Bowel sounds are normal.      Palpations: Abdomen is soft.   Musculoskeletal:         General: Normal range of motion.      Cervical back: Normal range of motion and neck supple.      Comments:  weakness   Skin:     General: Skin is warm and dry.      Capillary Refill: Capillary refill takes less than 2 seconds.   Neurological:      Mental Status: She is alert. Mental status is at baseline.   Psychiatric:         Mood and Affect: Mood normal.         Behavior: Behavior normal.         Assessment/Plan   Problem List Items Addressed This Visit       Hypertension, essential      Stable   BP at goal   lisinopril metoprolol   monitor         Dementia (Multi)      Stable   baseline mentation   converses easily   donepezil memantine   monitor           Generalized weakness      Reinforced calling for assistance when needed   walker  assist         Congestion of nasal sinus - Primary     Nasal drainage and sneezing  Covid test         Malaise      COVID test   CXR   CBC, BMP          Medications, treatments, and labs reviewed  Continue medications and treatments as listed in EMR    Darling Harry, APRN-CNP

## 2024-10-18 ENCOUNTER — NURSING HOME VISIT (OUTPATIENT)
Dept: POST ACUTE CARE | Facility: EXTERNAL LOCATION | Age: 89
End: 2024-10-18
Payer: MEDICARE

## 2024-10-18 DIAGNOSIS — S51.812D SKIN TEAR OF FOREARM WITHOUT COMPLICATION, LEFT, SUBSEQUENT ENCOUNTER: ICD-10-CM

## 2024-10-18 DIAGNOSIS — R63.0 POOR APPETITE: ICD-10-CM

## 2024-10-18 DIAGNOSIS — F01.50 VASCULAR DEMENTIA WITHOUT BEHAVIORAL DISTURBANCE, PSYCHOTIC DISTURBANCE, MOOD DISTURBANCE, OR ANXIETY, UNSPECIFIED DEMENTIA SEVERITY (MULTI): Primary | ICD-10-CM

## 2024-10-18 DIAGNOSIS — I10 HYPERTENSION, ESSENTIAL: ICD-10-CM

## 2024-10-18 PROCEDURE — 99348 HOME/RES VST EST LOW MDM 30: CPT

## 2024-10-18 NOTE — LETTER
Patient: Hodan Henry  : 1929    Encounter Date: 10/18/2024    PROGRESS NOTE    Subjective  Chief complaint: Hodan Henry is a 89 y.o. female who is an assisted living facility patient being seen and evaluated for general medical care and monthly follow-up    HPI:  Patient seen and evaluated for general medical care and monthly follow-up.  Patient at baseline mentation, pleasant, cooperative.  Offers no complaints at time.  BP within goal.  Engages in activities and meals with other residents. .  Patient sustained skin tear to L UE during fall .  No other injuries identified.  No further falls reported.  No concerns per nursing at this time      Objective  Vital signs:  112/63, , 98.8, 68, 20, 96%    Physical Exam  Constitutional:       Appearance: Normal appearance.   HENT:      Head: Normocephalic.      Mouth/Throat:      Mouth: Mucous membranes are moist.   Eyes:      Extraocular Movements: Extraocular movements intact.   Cardiovascular:      Rate and Rhythm: Normal rate and regular rhythm.      Pulses: Normal pulses.      Heart sounds: Normal heart sounds.   Pulmonary:      Effort: Pulmonary effort is normal.      Breath sounds: Normal breath sounds.   Abdominal:      General: Bowel sounds are normal.      Palpations: Abdomen is soft.   Musculoskeletal:         General: Normal range of motion.      Cervical back: Normal range of motion and neck supple.      Comments:  weakness   Skin:     General: Skin is warm and dry.      Capillary Refill: Capillary refill takes less than 2 seconds.      Comments: .  Skin tear L UE   Neurological:      Mental Status: She is alert. Mental status is at baseline.   Psychiatric:         Mood and Affect: Mood normal.         Behavior: Behavior normal.         Assessment/Plan  Problem List Items Addressed This Visit       Hypertension, essential      Stable   BP at goal   lisinopril metoprolol   monitor         Dementia - Primary      Stable   baseline mentation   converses  easily   donepezil memantine   monitor           Poor appetite      Stable weight   Mirtazapine   boost supplements 3 times daily   monitor intake and weight         Skin tear of forearm without complication, left, subsequent encounter      cleanse with NS, pat dry, apply Xeroform and gauze wrap daily and as needed          Medications, treatments, and labs reviewed  Continue medications and treatments as listed in EMR    SANIYA Burt      Electronically Signed By: SANIYA Burt   10/20/24  8:15 PM

## 2024-10-20 PROBLEM — R09.81 CONGESTION OF NASAL SINUS: Status: RESOLVED | Noted: 2024-09-03 | Resolved: 2024-10-20

## 2024-10-20 PROBLEM — R05.1 ACUTE COUGH: Status: RESOLVED | Noted: 2024-08-17 | Resolved: 2024-10-20

## 2024-10-20 PROBLEM — S51.812D SKIN TEAR OF FOREARM WITHOUT COMPLICATION, LEFT, SUBSEQUENT ENCOUNTER: Status: ACTIVE | Noted: 2024-10-20

## 2024-10-21 NOTE — PROGRESS NOTES
PROGRESS NOTE    Subjective   Chief complaint: Hodan Henry is a 89 y.o. female who is an assisted living facility patient being seen and evaluated for general medical care and monthly follow-up    HPI:  Patient seen and evaluated for general medical care and monthly follow-up.  Patient at baseline mentation, pleasant, cooperative.  Offers no complaints at time.  BP within goal.  Engages in activities and meals with other residents. .  Patient sustained skin tear to L UE during fall 2010/14.  No other injuries identified.  No further falls reported.  No concerns per nursing at this time      Objective   Vital signs:  112/63, , 98.8, 68, 20, 96%    Physical Exam  Constitutional:       Appearance: Normal appearance.   HENT:      Head: Normocephalic.      Mouth/Throat:      Mouth: Mucous membranes are moist.   Eyes:      Extraocular Movements: Extraocular movements intact.   Cardiovascular:      Rate and Rhythm: Normal rate and regular rhythm.      Pulses: Normal pulses.      Heart sounds: Normal heart sounds.   Pulmonary:      Effort: Pulmonary effort is normal.      Breath sounds: Normal breath sounds.   Abdominal:      General: Bowel sounds are normal.      Palpations: Abdomen is soft.   Musculoskeletal:         General: Normal range of motion.      Cervical back: Normal range of motion and neck supple.      Comments:  weakness   Skin:     General: Skin is warm and dry.      Capillary Refill: Capillary refill takes less than 2 seconds.      Comments: .  Skin tear L UE   Neurological:      Mental Status: She is alert. Mental status is at baseline.   Psychiatric:         Mood and Affect: Mood normal.         Behavior: Behavior normal.         Assessment/Plan   Problem List Items Addressed This Visit       Hypertension, essential      Stable   BP at goal   lisinopril metoprolol   monitor         Dementia - Primary      Stable   baseline mentation   converses easily   donepezil memantine   monitor           Poor appetite       Stable weight   Mirtazapine   boost supplements 3 times daily   monitor intake and weight         Skin tear of forearm without complication, left, subsequent encounter      cleanse with NS, pat dry, apply Xeroform and gauze wrap daily and as needed          Medications, treatments, and labs reviewed  Continue medications and treatments as listed in EMR    Darling Harry, APRN-CNP

## 2024-11-15 ENCOUNTER — NURSING HOME VISIT (OUTPATIENT)
Dept: POST ACUTE CARE | Facility: EXTERNAL LOCATION | Age: 89
End: 2024-11-15
Payer: MEDICARE

## 2024-11-15 DIAGNOSIS — K21.9 GASTROESOPHAGEAL REFLUX DISEASE WITHOUT ESOPHAGITIS: ICD-10-CM

## 2024-11-15 DIAGNOSIS — I10 HYPERTENSION, ESSENTIAL: ICD-10-CM

## 2024-11-15 DIAGNOSIS — F01.50 VASCULAR DEMENTIA WITHOUT BEHAVIORAL DISTURBANCE, PSYCHOTIC DISTURBANCE, MOOD DISTURBANCE, OR ANXIETY, UNSPECIFIED DEMENTIA SEVERITY (MULTI): Primary | ICD-10-CM

## 2024-11-15 PROCEDURE — 99348 HOME/RES VST EST LOW MDM 30: CPT

## 2024-11-15 NOTE — LETTER
Patient: Hodan Henry  : 1929    Encounter Date: 11/15/2024    PROGRESS NOTE    Subjective  Chief complaint: Hodna Henry is a 89 y.o. female who is an assisted living facility patient being seen and evaluated for general medical care and monthly follow up    HPI:  Patient seen and evaluated for general medical care and monthly follow-up.  Patient at baseline mentation, pleasant, cooperative. Complains of nasal congestion, sneezing and nonproductive cough. No F/C/N/V/D. BP within goal.  Engages in activities and meals with other residents.  No outbursts or agitation noted per nursing.  No concerns per nursing at this time      Objective  Vital signs:  138/62, 98.8, 68, 20, 96%    Physical Exam  Constitutional:       Appearance: Normal appearance.   HENT:      Head: Normocephalic.      Mouth/Throat:      Mouth: Mucous membranes are moist.   Eyes:      Extraocular Movements: Extraocular movements intact.   Cardiovascular:      Rate and Rhythm: Normal rate and regular rhythm.      Pulses: Normal pulses.      Heart sounds: Normal heart sounds.   Pulmonary:      Effort: Pulmonary effort is normal.      Breath sounds: Normal breath sounds.   Abdominal:      General: Bowel sounds are normal.      Palpations: Abdomen is soft.   Musculoskeletal:         General: Normal range of motion.      Cervical back: Normal range of motion and neck supple.      Comments:  weakness   Skin:     General: Skin is warm and dry.      Capillary Refill: Capillary refill takes less than 2 seconds.      Comments: .  Skin tear L UE   Neurological:      Mental Status: She is alert. Mental status is at baseline.   Psychiatric:         Mood and Affect: Mood normal.         Behavior: Behavior normal.         Assessment/Plan  Problem List Items Addressed This Visit       Hypertension, essential      Stable   BP at goal   lisinopril metoprolol   monitor         Dementia - Primary      Stable   baseline mentation   converses easily   donepezil  memantine   monitor           Gastroesophageal reflux disease without esophagitis      Stable   no epigastric pain or acidic taste in mouth   Encouraged to sit up after eating   Omeprazole   monitor          Medications, treatments, and labs reviewed  Continue medications and treatments as listed in EMR    SANIYA Burt      Electronically Signed By: SANIYA Burt   11/18/24  7:37 PM

## 2024-11-19 NOTE — PROGRESS NOTES
PROGRESS NOTE    Subjective   Chief complaint: Hodan Henry is a 89 y.o. female who is an assisted living facility patient being seen and evaluated for general medical care and monthly follow up    HPI:  Patient seen and evaluated for general medical care and monthly follow-up.  Patient at baseline mentation, pleasant, cooperative. Complains of nasal congestion, sneezing and nonproductive cough. No F/C/N/V/D. BP within goal.  Engages in activities and meals with other residents.  No outbursts or agitation noted per nursing.  No concerns per nursing at this time      Objective   Vital signs:  138/62, 98.8, 68, 20, 96%    Physical Exam  Constitutional:       Appearance: Normal appearance.   HENT:      Head: Normocephalic.      Mouth/Throat:      Mouth: Mucous membranes are moist.   Eyes:      Extraocular Movements: Extraocular movements intact.   Cardiovascular:      Rate and Rhythm: Normal rate and regular rhythm.      Pulses: Normal pulses.      Heart sounds: Normal heart sounds.   Pulmonary:      Effort: Pulmonary effort is normal.      Breath sounds: Normal breath sounds.   Abdominal:      General: Bowel sounds are normal.      Palpations: Abdomen is soft.   Musculoskeletal:         General: Normal range of motion.      Cervical back: Normal range of motion and neck supple.      Comments:  weakness   Skin:     General: Skin is warm and dry.      Capillary Refill: Capillary refill takes less than 2 seconds.      Comments: .  Skin tear L UE   Neurological:      Mental Status: She is alert. Mental status is at baseline.   Psychiatric:         Mood and Affect: Mood normal.         Behavior: Behavior normal.         Assessment/Plan   Problem List Items Addressed This Visit       Hypertension, essential      Stable   BP at goal   lisinopril metoprolol   monitor         Dementia - Primary      Stable   baseline mentation   converses easily   donepezil memantine   monitor           Gastroesophageal reflux disease without  esophagitis      Stable   no epigastric pain or acidic taste in mouth   Encouraged to sit up after eating   Omeprazole   monitor          Medications, treatments, and labs reviewed  Continue medications and treatments as listed in EMR    Darling Harry, APRN-CNP

## 2024-12-06 ENCOUNTER — NURSING HOME VISIT (OUTPATIENT)
Dept: POST ACUTE CARE | Facility: EXTERNAL LOCATION | Age: 89
End: 2024-12-06
Payer: MEDICARE

## 2024-12-06 DIAGNOSIS — K21.9 GASTROESOPHAGEAL REFLUX DISEASE WITHOUT ESOPHAGITIS: ICD-10-CM

## 2024-12-06 DIAGNOSIS — B34.9 VIRAL ILLNESS: Primary | ICD-10-CM

## 2024-12-06 DIAGNOSIS — F01.50 VASCULAR DEMENTIA WITHOUT BEHAVIORAL DISTURBANCE, PSYCHOTIC DISTURBANCE, MOOD DISTURBANCE, OR ANXIETY, UNSPECIFIED DEMENTIA SEVERITY (MULTI): ICD-10-CM

## 2024-12-06 DIAGNOSIS — I10 HYPERTENSION, ESSENTIAL: ICD-10-CM

## 2024-12-06 PROCEDURE — 99348 HOME/RES VST EST LOW MDM 30: CPT

## 2024-12-06 NOTE — LETTER
Patient: Hodan Henry  : 1929    Encounter Date: 2024    PROGRESS NOTE    Subjective  Chief complaint: Hodan Henry is a 89 y.o. female who is an assisted living facility patient being seen and evaluated for general medical care and monthly follow up    HPI:  Patient seen and evaluated for general medical care and monthly follow-up.  Patient at baseline mentation, pleasant, cooperative.  Converses easily. Complains of nasal congestion and nonproductive cough. No F/C/N/V/D. BP within goal.  Engages in activities and meals with other residents.  No outbursts or agitation noted per nursing.  No concerns per nursing at this time      Objective  Vital signs:  99/58, 97.1, 90, 16, 96%    Physical Exam  Constitutional:       Appearance: Normal appearance.   HENT:      Head: Normocephalic.      Mouth/Throat:      Mouth: Mucous membranes are moist.   Eyes:      Extraocular Movements: Extraocular movements intact.   Cardiovascular:      Rate and Rhythm: Normal rate and regular rhythm.      Pulses: Normal pulses.      Heart sounds: Normal heart sounds.   Pulmonary:      Effort: Pulmonary effort is normal.      Breath sounds: Normal breath sounds.   Abdominal:      General: Bowel sounds are normal.      Palpations: Abdomen is soft.   Musculoskeletal:         General: Normal range of motion.      Cervical back: Normal range of motion and neck supple.   Skin:     General: Skin is warm and dry.      Capillary Refill: Capillary refill takes less than 2 seconds.   Neurological:      Mental Status: She is alert. Mental status is at baseline.   Psychiatric:         Mood and Affect: Mood normal.         Behavior: Behavior normal.         Assessment/Plan  Problem List Items Addressed This Visit       Hypertension, essential      Stable   BP at goal   lisinopril metoprolol   monitor         Dementia      Stable   baseline mentation   converses easily   donepezil memantine   monitor           Gastroesophageal reflux disease  without esophagitis      Stable   no epigastric pain or acidic taste in mouth   Encouraged to sit up after eating   Omeprazole   monitor         Viral illness - Primary      Nonproductive cough and nasal congestion   No constitutional symptoms   COVID test   Mucinex            Medications, treatments, and labs reviewed  Continue medications and treatments as listed in EMR    SANIYA Burt      Electronically Signed By: SANIYA Burt   12/9/24 10:30 PM

## 2024-12-09 PROBLEM — B34.9 VIRAL ILLNESS: Status: ACTIVE | Noted: 2024-12-09

## 2024-12-10 NOTE — PROGRESS NOTES
PROGRESS NOTE    Subjective   Chief complaint: Hodan Henry is a 89 y.o. female who is an assisted living facility patient being seen and evaluated for general medical care and monthly follow up    HPI:  Patient seen and evaluated for general medical care and monthly follow-up.  Patient at baseline mentation, pleasant, cooperative.  Converses easily. Complains of nasal congestion and nonproductive cough. No F/C/N/V/D. BP within goal.  Engages in activities and meals with other residents.  No outbursts or agitation noted per nursing.  No concerns per nursing at this time      Objective   Vital signs:  99/58, 97.1, 90, 16, 96%    Physical Exam  Constitutional:       Appearance: Normal appearance.   HENT:      Head: Normocephalic.      Mouth/Throat:      Mouth: Mucous membranes are moist.   Eyes:      Extraocular Movements: Extraocular movements intact.   Cardiovascular:      Rate and Rhythm: Normal rate and regular rhythm.      Pulses: Normal pulses.      Heart sounds: Normal heart sounds.   Pulmonary:      Effort: Pulmonary effort is normal.      Breath sounds: Normal breath sounds.   Abdominal:      General: Bowel sounds are normal.      Palpations: Abdomen is soft.   Musculoskeletal:         General: Normal range of motion.      Cervical back: Normal range of motion and neck supple.   Skin:     General: Skin is warm and dry.      Capillary Refill: Capillary refill takes less than 2 seconds.   Neurological:      Mental Status: She is alert. Mental status is at baseline.   Psychiatric:         Mood and Affect: Mood normal.         Behavior: Behavior normal.         Assessment/Plan   Problem List Items Addressed This Visit       Hypertension, essential      Stable   BP at goal   lisinopril metoprolol   monitor         Dementia      Stable   baseline mentation   converses easily   donepezil memantine   monitor           Gastroesophageal reflux disease without esophagitis      Stable   no epigastric pain or acidic taste in  mouth   Encouraged to sit up after eating   Omeprazole   monitor         Viral illness - Primary      Nonproductive cough and nasal congestion   No constitutional symptoms   COVID test   Mucinex            Medications, treatments, and labs reviewed  Continue medications and treatments as listed in EMR    Darling Harry, APRN-CNP

## 2025-03-06 NOTE — ASSESSMENT & PLAN NOTE
Stable   no epigastric pain or acidic taste in mouth   Encouraged to sit up after eating   Omeprazole   monitor   SURVEY:     You may be receiving a survey from [facility] [employer] regarding your visit today.     Please complete the survey to enable us to provide the highest quality of care to you and your family.     If you cannot score us a very good on any question, please call the office to discuss how we could have made your experience a very good one.     Thank you,    Navneet Trujillo, APRN-CNP  Nia Biswas, APRN-CNP  Pearl, LPN  Josselin, CMA  Issa, CMA  Freda, CMA  Cesilia, PCA  Nina, CMA  Tosin, PM